# Patient Record
Sex: FEMALE | Race: WHITE | NOT HISPANIC OR LATINO | Employment: OTHER | URBAN - METROPOLITAN AREA
[De-identification: names, ages, dates, MRNs, and addresses within clinical notes are randomized per-mention and may not be internally consistent; named-entity substitution may affect disease eponyms.]

---

## 2018-12-19 ENCOUNTER — TELEPHONE (OUTPATIENT)
Dept: ENDOCRINOLOGY | Facility: HOSPITAL | Age: 78
End: 2018-12-19

## 2018-12-19 DIAGNOSIS — E89.0 POSTSURGICAL HYPOTHYROIDISM: ICD-10-CM

## 2018-12-19 DIAGNOSIS — E21.3 HYPERPARATHYROIDISM, UNSPECIFIED (HCC): Primary | ICD-10-CM

## 2019-01-11 DIAGNOSIS — E03.9 ACQUIRED HYPOTHYROIDISM: Primary | ICD-10-CM

## 2019-01-14 RX ORDER — LEVOTHYROXINE SODIUM 0.1 MG/1
TABLET ORAL
Qty: 90 TABLET | Refills: 0 | Status: SHIPPED | OUTPATIENT
Start: 2019-01-14 | End: 2019-02-12 | Stop reason: SDUPTHER

## 2019-02-12 ENCOUNTER — OFFICE VISIT (OUTPATIENT)
Dept: ENDOCRINOLOGY | Facility: HOSPITAL | Age: 79
End: 2019-02-12
Payer: MEDICARE

## 2019-02-12 VITALS
WEIGHT: 141.6 LBS | SYSTOLIC BLOOD PRESSURE: 130 MMHG | BODY MASS INDEX: 25.09 KG/M2 | HEART RATE: 98 BPM | HEIGHT: 63 IN | DIASTOLIC BLOOD PRESSURE: 78 MMHG

## 2019-02-12 DIAGNOSIS — E89.0 POSTSURGICAL HYPOTHYROIDISM: Primary | ICD-10-CM

## 2019-02-12 DIAGNOSIS — M85.832 OSTEOPENIA OF LEFT FOREARM: ICD-10-CM

## 2019-02-12 DIAGNOSIS — Z98.890 HISTORY OF PARATHYROID SURGERY: ICD-10-CM

## 2019-02-12 DIAGNOSIS — E03.9 ACQUIRED HYPOTHYROIDISM: ICD-10-CM

## 2019-02-12 DIAGNOSIS — M85.88 OSTEOPENIA OF SPINE: ICD-10-CM

## 2019-02-12 DIAGNOSIS — Z86.39 HISTORY OF HYPERTHYROIDISM: ICD-10-CM

## 2019-02-12 DIAGNOSIS — M81.6 LOCALIZED OSTEOPOROSIS WITHOUT CURRENT PATHOLOGICAL FRACTURE: ICD-10-CM

## 2019-02-12 DIAGNOSIS — E21.3 HYPERPARATHYROIDISM, UNSPECIFIED (HCC): ICD-10-CM

## 2019-02-12 PROBLEM — M85.80 OSTEOPENIA: Status: ACTIVE | Noted: 2019-02-12

## 2019-02-12 PROBLEM — M81.0 AGE-RELATED OSTEOPOROSIS WITHOUT CURRENT PATHOLOGICAL FRACTURE: Status: ACTIVE | Noted: 2019-02-12

## 2019-02-12 PROCEDURE — 99215 OFFICE O/P EST HI 40 MIN: CPT | Performed by: INTERNAL MEDICINE

## 2019-02-12 RX ORDER — ASPIRIN 81 MG/1
81 TABLET ORAL DAILY
COMMUNITY

## 2019-02-12 RX ORDER — METOPROLOL SUCCINATE 25 MG/1
25 TABLET, EXTENDED RELEASE ORAL
COMMUNITY
Start: 2018-11-19

## 2019-02-12 RX ORDER — AMLODIPINE BESYLATE 5 MG/1
5 TABLET ORAL DAILY
COMMUNITY
End: 2020-02-12 | Stop reason: ALTCHOICE

## 2019-02-12 RX ORDER — POLYETHYLENE GLYCOL 3350 17 G/17G
17 POWDER, FOR SOLUTION ORAL EVERY OTHER DAY
COMMUNITY
End: 2020-02-12 | Stop reason: ALTCHOICE

## 2019-02-12 RX ORDER — LEVOTHYROXINE SODIUM 0.1 MG/1
100 TABLET ORAL DAILY
Qty: 90 TABLET | Refills: 3 | Status: SHIPPED | OUTPATIENT
Start: 2019-02-12 | End: 2020-02-12 | Stop reason: DRUGHIGH

## 2019-02-12 RX ORDER — SIMVASTATIN 10 MG
10 TABLET ORAL
COMMUNITY
Start: 2018-12-10

## 2019-02-12 RX ORDER — LISINOPRIL 20 MG/1
20 TABLET ORAL DAILY
COMMUNITY
End: 2020-02-12 | Stop reason: ALTCHOICE

## 2019-02-12 RX ORDER — LANOLIN ALCOHOL/MO/W.PET/CERES
1 CREAM (GRAM) TOPICAL
COMMUNITY
End: 2019-08-20 | Stop reason: ALTCHOICE

## 2019-02-12 NOTE — LETTER
February 12, 2019     Janeth Higginbotham MD  [address] [facility] [email or web address], [address] [address]    Patient: Jeffery Harrington   YOB: 1940   Date of Visit: 2/12/2019       Dear Dr Chelly Floyd: Thank you for referring Saray Mendieta to me for evaluation  Below are my notes for this consultation  If you have questions, please do not hesitate to call me  I look forward to following your patient along with you  Sincerely,        Rita Solano MD        CC: DO Rita Porras MD  2/12/2019 12:06 PM  Sign at close encounter  2/12/2019    Assessment/Plan      Diagnoses and all orders for this visit:    Postsurgical hypothyroidism  -     T4, free Lab Collect; Future  -     TSH, 3rd generation Lab Collect; Future    History of hyperthyroidism  -     T4, free Lab Collect; Future  -     TSH, 3rd generation Lab Collect; Future    Hyperparathyroidism, unspecified ([facility] [address] )  -     Comprehensive metabolic panel Lab Collect; Future  -     Phosphorus Lab Collect; Future  -     PTH, intact Lab Collect Lab Collect; Future    History of parathyroid surgery  -     Comprehensive metabolic panel Lab Collect; Future  -     Phosphorus Lab Collect; Future  -     PTH, intact Lab Collect Lab Collect; Future    Osteopenia of spine  -     Comprehensive metabolic panel Lab Collect; Future  -     Phosphorus Lab Collect; Future  -     PTH, intact Lab Collect Lab Collect; Future  -     T4, free Lab Collect; Future  -     TSH, 3rd generation Lab Collect; Future    Localized osteoporosis without current pathological fracture  -     Comprehensive metabolic panel Lab Collect; Future  -     Phosphorus Lab Collect; Future  -     PTH, intact Lab Collect Lab Collect; Future  -     T4, free Lab Collect; Future  -     TSH, 3rd generation Lab Collect; Future    Acquired hypothyroidism  -     levothyroxine 100 mcg tablet;  Take 1 tablet (100 mcg total) by mouth daily    Osteopenia of left forearm    Other orders  -     metoprolol succinate (TOPROL-XL) 25 mg 24 hr tablet; Take 25 mg by mouth daily   -     lisinopril (ZESTRIL) 20 mg tablet; Take 20 mg by mouth daily  -     amLODIPine (NORVASC) 5 mg tablet; Take 5 mg by mouth daily  -     simvastatin (ZOCOR) 10 mg tablet; Take 10 mg by mouth daily at bedtime   -     Calcium Carbonate-Vitamin D (OSCAL 500/200 D-3 PO); Take by mouth Take 2 tablets daily  -     aspirin (ECOTRIN LOW STRENGTH) 81 mg EC tablet; Take 81 mg by mouth daily  -     magnesium chloride-calcium (SLOW-MAG) 71 5-119 mg; Take 2 tablets by mouth daily  -     Multiple Vitamin (MULTI-VITAMIN DAILY PO); Take by mouth daily   -     glucosamine-chondroitin 500-400 MG tablet; Take 1 tablet by mouth 2 tablets daily  -     polyethylene glycol (MIRALAX) 17 g packet; Take 17 g by mouth every other day        Assessment/Plan:  1  Hypothyroidism post thyroidectomy  She is biochemically euthyroid based on her most recent blood work which was normal   She will continue the same levothyroxine 100 mcg daily  2  History of hyperthyroidism  She has no evidence of Graves eye disease and sees an eye doctor on a regular basis on a yearly basis  2  History of hyperparathyroidism post 2 parathyroid adenoma removals  Most recent calcium, phosphorus, and parathyroid hormone levels have been normal   These will be followed over time as she is at risk for developing hyperparathyroidism again  4  History of osteopenia of the spine and forearm and osteoporosis of the left hip  She had a DEXA scan done within the last year or two  I do not have those results so I will try to obtain them to see if there are any changes  I have asked her to follow up in 1 year with preceding TSH, free T4, CMP, phosphorus, and intact parathyroid hormone level        CC:  Hypothyroid and hyperparathyroid follow-up    History of Present Illness     HPI: Mickey Kam is a [de-identified]y o  year old female with history of hypothyroidism post thyroidectomy and hyperparathyroidism post 2 parathyroid adenoma removals  She has hypothyroidism post partial thyroidectomy at the age of 22 for hyperthyroidism  She is currently taking levothyroxine 100 mcg daily  She is always on the cold side  She denies heat intolerance, palpitation, tremors, anxiety or depression, insomnia, or weight changes  She has no diarrhea but does tend to be on the constipated side  She has some fatigue  She has some dry skin of her legs but no brittle nails or hair loss  She has no diplopia  She has no compressive thyroid symptoms or difficulties with swallowing  Of note, she did have radiation treatment to her left breast for breast cancer and it is unclear whether the neck was involve in the radiation field  She was found to have hyperparathyroidism in 2012 and a right inferior parathyroid adenoma was removed with complications of right pneumothorax and traction injury to the recurrent laryngeal nerve on the right postoperatively  She was seen by ENT and this resolved  Unfortunately, she continued to have hyperparathyroidism and was placed on Sensipar postoperatively  She saw Dr Simone Nichole at [facility] and underwent right superior parathyroid adenoma removal in January of 2013 which did resolve her hyperparathyroidism  She has no history of renal stones but has lost 3 inches of height and has a history of osteoporosis of the left hip with osteopenia of the forearm and spine  Reportedly, last DEXA scan was within the last several years but I do not have that report  She denies poly urea or polydipsia but has nocturia once a night  She denies any confusion  She has some fatigue  She does tend to be on the constipated side and uses MiraLax every other day  She denies any perioral or finger tip paresthesias or muscle twitches or spasms  She denies muscle weakness  Review of Systems   Constitutional: Positive for fatigue   Negative for unexpected weight change  HENT: Negative for hearing loss, tinnitus and trouble swallowing  Had left sided XRT after breast cancer  Eyes: Negative for visual disturbance  No diplopia  Wears glasses  Sees eye doctor yearly  Respiratory: Negative for chest tightness and shortness of breath  Cardiovascular: Positive for leg swelling  Negative for chest pain and palpitations  Ankles swell by the end of the day  Gastrointestinal: Positive for constipation  Negative for abdominal pain, diarrhea and nausea  Tends to be on the constipated side, takes miralax every other day  Endocrine: Positive for cold intolerance  Negative for heat intolerance, polydipsia and polyuria  Always cold  Nocturia once a night  Genitourinary:        No kidney stones  Musculoskeletal: Positive for arthralgias and back pain  Has right arm lymphedema  Back to lymphedema therapy in 2018 and now wearing the arm sleeve for lymphedema  Has bilateral knee pain  Surgery postponed due to breast cancer surgery  Gets injections in knees at times  Some low back pain at times  Last Dexa in 2018 and history of height loss of 3 inches in the past    Skin: Negative for rash  Has some dry skin especially of the legs  No brittle nails  No hair loss  Neurological: Negative for dizziness, tremors, weakness, light-headedness, numbness and headaches  No perioral numbness or tingling  No muscle twitches or spasms  Psychiatric/Behavioral: Negative for confusion, dysphoric mood and sleep disturbance  The patient is not nervous/anxious  Can't sleep on her back  Sometimes can't get back to sleep after urinating at night  Gets 6 good hours of sleep a night         Historical Information   Past Medical History:   Diagnosis Date    Breast cancer [facility]) 1972    right; mastectomy    Breast cancer ([facility] [address] ) 1992    left, post lumpectomy and AND with XRT and tamoxifen    Breast cancer ([facility] [address] ) 05/2018    left and now mastectomy    Chronic acquired lymphedema     right upper extremity    Coronary artery disease     Depression     History of endocarditis     History of pneumothorax 2012    right post parathyroid surgery    Hyperlipidemia     Hypertension     Hypomagnesemia     Myocardial infarction (HCC)     Osteoarthritis     Ovarian cancer (HCC)     stage 3C, right;  9 5 cm adenocarcinoma with 5 5 cm serosal met involving rectosigmoid colon   Received Taxol/carbiplatin    Recurrent laryngeal nerve palsy 2012    due to traction injury for right parathyroidectomy    Squamous cell skin cancer 03/2018    scalp    Thymoma 2009    resected     Past Surgical History:   Procedure Laterality Date    BREAST LUMPECTOMY Left 1992    with AND    CHOLECYSTECTOMY OPEN      post cholecystostomy tube with hemorrhage    HYSTERECTOMY  1970    with appendectomy    MAMMO STEREOTACTIC BREAST BIOPSY LEFT (ALL INC) Left 2018    MASS EXCISION  2009    thymoma resection    MASTECTOMY Right     MASTECTOMY Left 05/2018    OVARY SURGERY      cancer removal with omenectomy and bowel resection with creation of gil's pouch    PARATHYROIDECTOMY Right 2012    PARATHYROIDECTOMY Right 01/2013    right superior parathyroid adenoma    SKIN CANCER EXCISION  03/2018    wide excision    THYROIDECTOMY      age 22 for hyperthyroidism, partial     Social History   Social History     Substance and Sexual Activity   Alcohol Use Never    Frequency: Never     Social History     Substance and Sexual Activity   Drug Use Never     Social History     Tobacco Use   Smoking Status Never Smoker   Smokeless Tobacco Never Used     Family History:   Family History   Problem Relation Age of Onset    Hypertension Mother     Stomach cancer Father     Diabetes type II Brother     Diabetes type II Brother     Breast cancer Sister     Cancer Brother     Cancer Brother     Heart disease Brother     Heart attack Sister        Meds/Allergies   Current Outpatient Medications   Medication Sig Dispense Refill    amLODIPine (NORVASC) 5 mg tablet Take 5 mg by mouth daily      aspirin (ECOTRIN LOW STRENGTH) 81 mg EC tablet Take 81 mg by mouth daily      Calcium Carbonate-Vitamin D (OSCAL 500/200 D-3 PO) Take by mouth Take 2 tablets daily      glucosamine-chondroitin 500-400 MG tablet Take 1 tablet by mouth 2 tablets daily      levothyroxine 100 mcg tablet Take 1 tablet (100 mcg total) by mouth daily 90 tablet 3    lisinopril (ZESTRIL) 20 mg tablet Take 20 mg by mouth daily      magnesium chloride-calcium (SLOW-MAG) 71 5-119 mg Take 2 tablets by mouth daily      metoprolol succinate (TOPROL-XL) 25 mg 24 hr tablet Take 25 mg by mouth daily       Multiple Vitamin (MULTI-VITAMIN DAILY PO) Take by mouth daily       polyethylene glycol (MIRALAX) 17 g packet Take 17 g by mouth every other day      simvastatin (ZOCOR) 10 mg tablet Take 10 mg by mouth daily at bedtime        No current facility-administered medications for this visit  Allergies   Allergen Reactions    Bactrim [Sulfamethoxazole-Trimethoprim] GI Intolerance    Penicillins Hives       Objective   Vitals: Blood pressure 130/78, pulse 98, height 5' 3" (1 6 m), weight 64 2 kg (141 lb 9 6 oz)  Invasive Devices          None          Physical Exam   Constitutional: She is oriented to person, place, and time  She appears well-developed and well-nourished  HENT:   Head: Normocephalic and atraumatic  Negative [place]ek sign  Eyes: Pupils are equal, round, and reactive to light  Conjunctivae and EOM are normal    No lid lag, stare, proptosis, or periorbital edema  Neck: Normal range of motion  Neck supple  No thyromegaly present  Healed anterior neck scar  No palpable thyroid tissue  No masses of the neck  No carotid bruits  Cardiovascular: Normal rate, regular rhythm and normal heart sounds  No murmur heard  Pulmonary/Chest: Effort normal and breath sounds normal  She has no wheezes  Post bilateral mastectomies  Abdominal: Soft  Bowel sounds are normal  There is no tenderness  Musculoskeletal: Normal range of motion  She exhibits edema  She exhibits no deformity  No tremor of the outstretched hands  No spinous process tenderness  No CVA tenderness  Trace bilateral ankle edema  Right upper extremity with lymphedema  Lymphadenopathy:     She has no cervical adenopathy  Neurological: She is alert and oriented to person, place, and time  She has normal reflexes  Deep tendon reflexes normal without briskness  Skin: Skin is warm and dry  No rash noted  Vitals reviewed  The history was obtained from the review of the chart, [facility] endocrinology notes, and from the patient  Lab Results:    Blood work done a [employer] [facility] on 01/28/2019 demonstrates a TSH of 2 96 with a free T4 1 57  CMP demonstrates a calcium of 9 6 with albumin of 4 2 and phosphorus of 3 6  Intact parathyroid hormone level was 34  The rest of the CMP was normal       No results found for this or any previous visit (from the past 02963 hour(s))        Future Appointments   Date Time Provider Solitario Sanchez   2/12/2020 11:20 AM Daun Spatz, [address] [facility]

## 2019-02-12 NOTE — PATIENT INSTRUCTIONS
Thyroid blood work normal    Continue the same levothyroxine 100 mcg daily  the parathyroid and calcium blood work are normal   We'll work on getting the dexa scan report  Follow up in 1 year with blood work

## 2019-02-12 NOTE — PROGRESS NOTES
2/12/2019    Assessment/Plan      Diagnoses and all orders for this visit:    Postsurgical hypothyroidism  -     T4, free Lab Collect; Future  -     TSH, 3rd generation Lab Collect; Future    History of hyperthyroidism  -     T4, free Lab Collect; Future  -     TSH, 3rd generation Lab Collect; Future    Hyperparathyroidism, unspecified ([facility] [address] )  -     Comprehensive metabolic panel Lab Collect; Future  -     Phosphorus Lab Collect; Future  -     PTH, intact Lab Collect Lab Collect; Future    History of parathyroid surgery  -     Comprehensive metabolic panel Lab Collect; Future  -     Phosphorus Lab Collect; Future  -     PTH, intact Lab Collect Lab Collect; Future    Osteopenia of spine  -     Comprehensive metabolic panel Lab Collect; Future  -     Phosphorus Lab Collect; Future  -     PTH, intact Lab Collect Lab Collect; Future  -     T4, free Lab Collect; Future  -     TSH, 3rd generation Lab Collect; Future    Localized osteoporosis without current pathological fracture  -     Comprehensive metabolic panel Lab Collect; Future  -     Phosphorus Lab Collect; Future  -     PTH, intact Lab Collect Lab Collect; Future  -     T4, free Lab Collect; Future  -     TSH, 3rd generation Lab Collect; Future    Acquired hypothyroidism  -     levothyroxine 100 mcg tablet; Take 1 tablet (100 mcg total) by mouth daily    Osteopenia of left forearm    Other orders  -     metoprolol succinate (TOPROL-XL) 25 mg 24 hr tablet; Take 25 mg by mouth daily   -     lisinopril (ZESTRIL) 20 mg tablet; Take 20 mg by mouth daily  -     amLODIPine (NORVASC) 5 mg tablet; Take 5 mg by mouth daily  -     simvastatin (ZOCOR) 10 mg tablet; Take 10 mg by mouth daily at bedtime   -     Calcium Carbonate-Vitamin D (OSCAL 500/200 D-3 PO); Take by mouth Take 2 tablets daily  -     aspirin (ECOTRIN LOW STRENGTH) 81 mg EC tablet;  Take 81 mg by mouth daily  -     magnesium chloride-calcium (SLOW-MAG) 71 5-119 mg; Take 2 tablets by mouth daily  -     Multiple Vitamin (MULTI-VITAMIN DAILY PO); Take by mouth daily   -     glucosamine-chondroitin 500-400 MG tablet; Take 1 tablet by mouth 2 tablets daily  -     polyethylene glycol (MIRALAX) 17 g packet; Take 17 g by mouth every other day        Assessment/Plan:  1  Hypothyroidism post thyroidectomy  She is biochemically euthyroid based on her most recent blood work which was normal   She will continue the same levothyroxine 100 mcg daily  2  History of hyperthyroidism  She has no evidence of Graves eye disease and sees an eye doctor on a regular basis on a yearly basis  2  History of hyperparathyroidism post 2 parathyroid adenoma removals  Most recent calcium, phosphorus, and parathyroid hormone levels have been normal   These will be followed over time as she is at risk for developing hyperparathyroidism again  4  History of osteopenia of the spine and forearm and osteoporosis of the left hip  She had a DEXA scan done within the last year or two  I do not have those results so I will try to obtain them to see if there are any changes  I have asked her to follow up in 1 year with preceding TSH, free T4, CMP, phosphorus, and intact parathyroid hormone level  CC:  Hypothyroid and hyperparathyroid follow-up    History of Present Illness     HPI: Rukhsana Ugalde is a [de-identified]y o  year old female with history of hypothyroidism post thyroidectomy and hyperparathyroidism post 2 parathyroid adenoma removals  She has hypothyroidism post partial thyroidectomy at the age of 22 for hyperthyroidism  She is currently taking levothyroxine 100 mcg daily  She is always on the cold side  She denies heat intolerance, palpitation, tremors, anxiety or depression, insomnia, or weight changes  She has no diarrhea but does tend to be on the constipated side  She has some fatigue  She has some dry skin of her legs but no brittle nails or hair loss  She has no diplopia    She has no compressive thyroid symptoms or difficulties with swallowing  Of note, she did have radiation treatment to her left breast for breast cancer and it is unclear whether the neck was involve in the radiation field  She was found to have hyperparathyroidism in 2012 and a right inferior parathyroid adenoma was removed with complications of right pneumothorax and traction injury to the recurrent laryngeal nerve on the right postoperatively  She was seen by ENT and this resolved  Unfortunately, she continued to have hyperparathyroidism and was placed on Sensipar postoperatively  She saw Dr Tavo Burgos at [facility] and underwent right superior parathyroid adenoma removal in January of 2013 which did resolve her hyperparathyroidism  She has no history of renal stones but has lost 3 inches of height and has a history of osteoporosis of the left hip with osteopenia of the forearm and spine  Reportedly, last DEXA scan was within the last several years but I do not have that report  She denies poly urea or polydipsia but has nocturia once a night  She denies any confusion  She has some fatigue  She does tend to be on the constipated side and uses MiraLax every other day  She denies any perioral or finger tip paresthesias or muscle twitches or spasms  She denies muscle weakness  Review of Systems   Constitutional: Positive for fatigue  Negative for unexpected weight change  HENT: Negative for hearing loss, tinnitus and trouble swallowing  Had left sided XRT after breast cancer  Eyes: Negative for visual disturbance  No diplopia  Wears glasses  Sees eye doctor yearly  Respiratory: Negative for chest tightness and shortness of breath  Cardiovascular: Positive for leg swelling  Negative for chest pain and palpitations  Ankles swell by the end of the day  Gastrointestinal: Positive for constipation  Negative for abdominal pain, diarrhea and nausea  Tends to be on the constipated side, takes miralax every other day  Endocrine: Positive for cold intolerance  Negative for heat intolerance, polydipsia and polyuria  Always cold  Nocturia once a night  Genitourinary:        No kidney stones  Musculoskeletal: Positive for arthralgias and back pain  Has right arm lymphedema  Back to lymphedema therapy in 2018 and now wearing the arm sleeve for lymphedema  Has bilateral knee pain  Surgery postponed due to breast cancer surgery  Gets injections in knees at times  Some low back pain at times  Last Dexa in 2018 and history of height loss of 3 inches in the past    Skin: Negative for rash  Has some dry skin especially of the legs  No brittle nails  No hair loss  Neurological: Negative for dizziness, tremors, weakness, light-headedness, numbness and headaches  No perioral numbness or tingling  No muscle twitches or spasms  Psychiatric/Behavioral: Negative for confusion, dysphoric mood and sleep disturbance  The patient is not nervous/anxious  Can't sleep on her back  Sometimes can't get back to sleep after urinating at night  Gets 6 good hours of sleep a night  Historical Information   Past Medical History:   Diagnosis Date    Breast cancer [facility]) 1972    right; mastectomy    Breast cancer ([facility] [address] ) 1992    left, post lumpectomy and AND with XRT and tamoxifen    Breast cancer ([facility] [address] ) 05/2018    left and now mastectomy    Chronic acquired lymphedema     right upper extremity    Coronary artery disease     Depression     History of endocarditis     History of pneumothorax 2012    right post parathyroid surgery    Hyperlipidemia     Hypertension     Hypomagnesemia     Myocardial infarction ([facility] [address] )     Osteoarthritis     Ovarian cancer (HCC)     stage 3C, right;  9 5 cm adenocarcinoma with 5 5 cm serosal met involving rectosigmoid colon   Received Taxol/carbiplatin    Recurrent laryngeal nerve palsy 2012    due to traction injury for right parathyroidectomy    Squamous cell skin cancer 03/2018    scalp    Thymoma 2009    resected     Past Surgical History:   Procedure Laterality Date    BREAST LUMPECTOMY Left 1992    with AND    CHOLECYSTECTOMY OPEN      post cholecystostomy tube with hemorrhage    HYSTERECTOMY  1970    with appendectomy    MAMMO STEREOTACTIC BREAST BIOPSY LEFT ([facility] INC) Left 2018    MASS EXCISION  2009    thymoma resection    MASTECTOMY Right     MASTECTOMY Left 05/2018    OVARY SURGERY      cancer removal with omenectomy and bowel resection with creation of gil's pouch    PARATHYROIDECTOMY Right 2012    PARATHYROIDECTOMY Right 01/2013    right superior parathyroid adenoma    SKIN CANCER EXCISION  03/2018    wide excision    THYROIDECTOMY      age 22 for hyperthyroidism, partial     Social History   Social History     Substance and Sexual Activity   Alcohol Use Never    Frequency: Never     Social History     Substance and Sexual Activity   Drug Use Never     Social History     Tobacco Use   Smoking Status Never Smoker   Smokeless Tobacco Never Used     Family History:   Family History   Problem Relation Age of Onset    Hypertension Mother     Stomach cancer Father     Diabetes type II Brother     Diabetes type II Brother     Breast cancer Sister     Cancer Brother     Cancer Brother     Heart disease Brother     Heart attack Sister        Meds/Allergies   Current Outpatient Medications   Medication Sig Dispense Refill    amLODIPine (NORVASC) 5 mg tablet Take 5 mg by mouth daily      aspirin (ECOTRIN LOW STRENGTH) 81 mg EC tablet Take 81 mg by mouth daily      Calcium Carbonate-Vitamin D (OSCAL 500/200 D-3 PO) Take by mouth Take 2 tablets daily      glucosamine-chondroitin 500-400 MG tablet Take 1 tablet by mouth 2 tablets daily      levothyroxine 100 mcg tablet Take 1 tablet (100 mcg total) by mouth daily 90 tablet 3    lisinopril (ZESTRIL) 20 mg tablet Take 20 mg by mouth daily      magnesium chloride-calcium (SLOW-MAG) 71 5-119 mg Take 2 tablets by mouth daily      metoprolol succinate (TOPROL-XL) 25 mg 24 hr tablet Take 25 mg by mouth daily       Multiple Vitamin (MULTI-VITAMIN DAILY PO) Take by mouth daily       polyethylene glycol (MIRALAX) 17 g packet Take 17 g by mouth every other day      simvastatin (ZOCOR) 10 mg tablet Take 10 mg by mouth daily at bedtime        No current facility-administered medications for this visit  Allergies   Allergen Reactions    Bactrim [Sulfamethoxazole-Trimethoprim] GI Intolerance    Penicillins Hives       Objective   Vitals: Blood pressure 130/78, pulse 98, height 5' 3" (1 6 m), weight 64 2 kg (141 lb 9 6 oz)  Invasive Devices          None          Physical Exam   Constitutional: She is oriented to person, place, and time  She appears well-developed and well-nourished  HENT:   Head: Normocephalic and atraumatic  Negative [place] sign  Eyes: Pupils are equal, round, and reactive to light  Conjunctivae and EOM are normal    No lid lag, stare, proptosis, or periorbital edema  Neck: Normal range of motion  Neck supple  No thyromegaly present  Healed anterior neck scar  No palpable thyroid tissue  No masses of the neck  No carotid bruits  Cardiovascular: Normal rate, regular rhythm and normal heart sounds  No murmur heard  Pulmonary/Chest: Effort normal and breath sounds normal  She has no wheezes  Post bilateral mastectomies  Abdominal: Soft  Bowel sounds are normal  There is no tenderness  Musculoskeletal: Normal range of motion  She exhibits edema  She exhibits no deformity  No tremor of the outstretched hands  No spinous process tenderness  No CVA tenderness  Trace bilateral ankle edema  Right upper extremity with lymphedema  Lymphadenopathy:     She has no cervical adenopathy  Neurological: She is alert and oriented to person, place, and time  She has normal reflexes  Deep tendon reflexes normal without briskness  Skin: Skin is warm and dry  No rash noted  Vitals reviewed  The history was obtained from the review of the chart, [facility] endocrinology notes, and from the patient  Lab Results:    Blood work done a [facility] on 01/28/2019 demonstrates a TSH of 2 96 with a free T4 1 57  CMP demonstrates a calcium of 9 6 with albumin of 4 2 and phosphorus of 3 6  Intact parathyroid hormone level was 34  The rest of the CMP was normal       No results found for this or any previous visit (from the past 58675 hour(s))        Future Appointments   Date Time Provider Solitario Sanchez   2/12/2020 11:20 AM Gabrielle Rowe, [facility]

## 2019-08-19 ENCOUNTER — TELEPHONE (OUTPATIENT)
Dept: ENDOCRINOLOGY | Facility: HOSPITAL | Age: 79
End: 2019-08-19

## 2019-08-19 NOTE — TELEPHONE ENCOUNTER
Pt saw her OBGYN last week and they felt a possible nodule on the right side  She has had issues for the last few weeks swallowing large pills  She said she isn't having any issues swallowing anything else  Do you want to see her before ordering any additional tests? She doesn't have a f/u till February

## 2019-08-19 NOTE — TELEPHONE ENCOUNTER
I would have her evaluated in the office  She had her thyroid out so there should be no thyroid tissue left

## 2019-08-20 ENCOUNTER — OFFICE VISIT (OUTPATIENT)
Dept: ENDOCRINOLOGY | Facility: HOSPITAL | Age: 79
End: 2019-08-20
Payer: MEDICARE

## 2019-08-20 VITALS
DIASTOLIC BLOOD PRESSURE: 76 MMHG | SYSTOLIC BLOOD PRESSURE: 124 MMHG | WEIGHT: 134.6 LBS | HEART RATE: 82 BPM | HEIGHT: 63 IN | BODY MASS INDEX: 23.85 KG/M2

## 2019-08-20 DIAGNOSIS — E21.3 HYPERPARATHYROIDISM, UNSPECIFIED (HCC): ICD-10-CM

## 2019-08-20 DIAGNOSIS — E89.0 POSTSURGICAL HYPOTHYROIDISM: Primary | ICD-10-CM

## 2019-08-20 DIAGNOSIS — Z98.890 HISTORY OF PARATHYROID SURGERY: ICD-10-CM

## 2019-08-20 DIAGNOSIS — R22.1 MASS OF RIGHT SIDE OF NECK: ICD-10-CM

## 2019-08-20 DIAGNOSIS — Z86.39 HISTORY OF HYPERTHYROIDISM: ICD-10-CM

## 2019-08-20 PROCEDURE — 99214 OFFICE O/P EST MOD 30 MIN: CPT | Performed by: INTERNAL MEDICINE

## 2019-08-20 RX ORDER — COLCHICINE 0.6 MG/1
0.6 TABLET ORAL DAILY
COMMUNITY

## 2019-08-20 NOTE — PATIENT INSTRUCTIONS
Let's get thyroid and neck ultrasound to see what/where this is  I recommend seeing Dr Vince Madrid to evaluate the swallowing issues  Follow up as scheduled for now

## 2019-08-20 NOTE — LETTER
August 20, 2019     Aristides Foss MD  [address] [address]    Patient: Selene Sorensen   YOB: 1940   Date of Visit: 8/20/2019       Dear Dr Keith Sofia: Thank you for referring Jesus Crockett to me for evaluation  Below are my notes for this consultation  If you have questions, please do not hesitate to call me  I look forward to following your patient along with you  Sincerely,        Jason Murphy MD        CC: DO Ana Hdz MD Aletta Estimable, MD Darl Scarlet, MD  8/20/2019  1:44 PM  Sign at close encounter  8/20/2019    Assessment/Plan      Diagnoses and all orders for this visit:    Postsurgical hypothyroidism  -     US head neck soft tissue; Future    Hyperparathyroidism, unspecified ([address] )  -     US head neck soft tissue; Future    Mass of right side of neck  -     US head neck soft tissue; Future    History of parathyroid surgery  -     US head neck soft tissue; Future    History of hyperthyroidism  -     US head neck soft tissue; Future    Other orders  -     colchicine (COLCRYS) 0 6 mg tablet; Take 0 6 mg by mouth daily        Assessment/Plan:    1  Hypothyroidism post thyroidectomy  For now, she will continue the same levothyroxine 100 mcg daily  2  Hyperparathyroidism post 2 parathyroid adenoma removals  3  Right-sided neck mass  She does appear to have a small nodule in the thyroid bed  I do not remember her having had this before  I will do a neck and soft tissue/thyroid ultrasound to better evaluate the tissue  She was warned she may need a CT scan of the neck in the future  4  Swallowing issues  She is starting to have some issues with swallowing large pills over the last 2 weeks  I have referred her back to her gastroenterologist, Dr Vidal John for further evaluation  She may need a swallowing study or EGD      At the moment, she has a follow-up visit scheduled in 2020 with blood work which she will keep at this point  CC:   Hypothyroid and hyperparathyroid follow-up    History of Present Illness     HPI: Chan Sol is a [de-identified]y o  year old female with history of  Hypothyroidism post thyroidectomy and hyperparathyroidism post 2 separate parathyroid adenoma removals  Was to GYN last week and he felt lump right side of neck  Reportedly had necrotic lesion 2016 biopsied right paratracheal area  Reportedly benign  Then CT neck several months later and lesion was gone  She started to have swallowing issues several weeks ago  Mostly to large pills  Has problems with swallowing large amounts of food, she feels it different going down  Her thyroidectomy occurred at the age of 22 for hyperthyroidism  It is unclear exactly how much of her thyroid was removed but I suspect most of it  She is always cold  She has alternating diarrhea and constipation  She denies palpitation, tremors, heat intolerance  She is fatigued and has lost 7 lb since last visit  She does admit to snacking less and eating less candy although appetite is a bit less also  She has some difficulty getting back to sleep after she wakes urinate  She has no diplopia  She was found to have hyperparathyroidism in 2012 and right inferior parathyroid adenoma was removed with complications of right pneumothorax and traction injury to the recurrent laryngeal nerve on the right  She was seen by ENT in this resolved  She then had continued hyperparathyroidism and was placed on Sensipar  She underwent right superior parathyroid adenoma removal in January of 2013 which did resolve her hyperparathyroidism  She denies polyuria or polydipsia but has once a night nocturia  She does have some constipation and fatigue  She denies confusion  Review of Systems   Constitutional: Positive for fatigue and unexpected weight change  Weight is 7 lb less than last visit  admits to snacking less and less candy and appetite a bit lower  Falls asleep when idle  HENT: Positive for trouble swallowing  Eyes: Negative for visual disturbance  No diplopia  Wears glasses  Respiratory: Negative for chest tightness and shortness of breath  Cardiovascular: Negative for chest pain and palpitations  Gastrointestinal: Positive for constipation and diarrhea  Negative for abdominal pain and nausea  Can have loose bowel movements alternating with constipation  Endocrine: Positive for cold intolerance  Negative for heat intolerance, polydipsia and polyuria  Nocturia once a night  Neurological: Negative for tremors and numbness  Psychiatric/Behavioral: Positive for sleep disturbance  Negative for confusion  Has some difficulty getting back to sleep after wakes to urinate, mind races  Historical Information   Past Medical History:   Diagnosis Date    Breast cancer [facility]) 1972    right; mastectomy    Breast cancer ([facility] ) 1992    left, post lumpectomy and AND with XRT and tamoxifen    Breast cancer ([facility] ) 05/2018    left and now mastectomy    Chronic acquired lymphedema     right upper extremity    Coronary artery disease     Depression     History of endocarditis     History of pneumothorax 2012    right post parathyroid surgery    Hyperlipidemia     Hypertension     Hypomagnesemia     Myocardial infarction ([facility] )     Osteoarthritis     Ovarian cancer (HCC)     stage 3C, right;  9 5 cm adenocarcinoma with 5 5 cm serosal met involving rectosigmoid colon   Received Taxol/carbiplatin    Personal history of calcium pyrophosphate deposition disease (CPPD)     Recurrent laryngeal nerve palsy 2012    due to traction injury for right parathyroidectomy    Squamous cell skin cancer 03/2018    scalp    Thymoma 2009    resected     Past Surgical History:   Procedure Laterality Date    BREAST LUMPECTOMY Left 1992    with AND    CHOLECYSTECTOMY OPEN      post cholecystostomy tube with hemorrhage    HYSTERECTOMY  1970    with appendectomy    MAMMO STEREOTACTIC BREAST BIOPSY LEFT ([facility] INC) Left 2018    MASS EXCISION  2009    thymoma resection    MASTECTOMY Right     MASTECTOMY Left 05/2018    OVARY SURGERY      cancer removal with omenectomy and bowel resection with creation of gil's pouch    PARATHYROIDECTOMY Right 2012    PARATHYROIDECTOMY Right 01/2013    right superior parathyroid adenoma    SKIN CANCER EXCISION  03/2018    wide excision    THYROIDECTOMY      age 22 for hyperthyroidism, partial     Social History   Social History     Substance and Sexual Activity   Alcohol Use Never    Frequency: Never     Social History     Substance and Sexual Activity   Drug Use Never     Social History     Tobacco Use   Smoking Status Never Smoker   Smokeless Tobacco Never Used     Family History:   Family History   Problem Relation Age of Onset    Hypertension Mother     Stomach cancer Father     Diabetes type II Brother     Diabetes type II Brother     Breast cancer Sister     Cancer Brother     Cancer Brother     Heart disease Brother     Heart attack Sister        Meds/Allergies   Current Outpatient Medications   Medication Sig Dispense Refill    amLODIPine (NORVASC) 5 mg tablet Take 5 mg by mouth daily      aspirin (ECOTRIN LOW STRENGTH) 81 mg EC tablet Take 81 mg by mouth daily      Calcium Carbonate-Vitamin D (OSCAL 500/200 D-3 PO) Take by mouth Take 2 tablets daily      colchicine (COLCRYS) 0 6 mg tablet Take 0 6 mg by mouth daily      levothyroxine 100 mcg tablet Take 1 tablet (100 mcg total) by mouth daily 90 tablet 3    lisinopril (ZESTRIL) 20 mg tablet Take 20 mg by mouth daily      magnesium chloride-calcium (SLOW-MAG) 71 5-119 mg Take 2 tablets by mouth daily      metoprolol succinate (TOPROL-XL) 25 mg 24 hr tablet Take 25 mg by mouth daily       Multiple Vitamin (MULTI-VITAMIN DAILY PO) Take by mouth daily       polyethylene glycol (MIRALAX) 17 g packet Take 17 g by mouth every other day      simvastatin (ZOCOR) 10 mg tablet Take 10 mg by mouth daily at bedtime        No current facility-administered medications for this visit  Allergies   Allergen Reactions    Bactrim [Sulfamethoxazole-Trimethoprim] GI Intolerance    Penicillins Hives       Objective   Vitals: Blood pressure 124/76, pulse 82, height 5' 3" (1 6 m), weight 61 1 kg (134 lb 9 6 oz)  Invasive Devices     None                 Physical Exam   Constitutional: She is oriented to person, place, and time  She appears well-developed and well-nourished  HENT:   Head: Normocephalic and atraumatic  Eyes: Pupils are equal, round, and reactive to light  Conjunctivae and EOM are normal      No lid lag, stare, proptosis, or periorbital edema  Neck: Normal range of motion  Neck supple    1-1 5 cm palpable neck nodule which may or may not be thyroid lateral to the trachea and superior to her surgical site  No lymphadenopathy  No bruits over the carotids  Cardiovascular: Normal rate, regular rhythm and normal heart sounds  No murmur heard  Pulmonary/Chest: Effort normal and breath sounds normal  She has no wheezes  Musculoskeletal: Normal range of motion  She exhibits no edema or deformity  No tremor of the outstretched hands  Lymphadenopathy:     She has no cervical adenopathy  Neurological: She is alert and oriented to person, place, and time  She has normal reflexes  Skin: Skin is warm and dry  No rash noted  Vitals reviewed  The history was obtained from the review of the chart and from the patient and   Lab Results:     I have no recent blood work  No results found for this or any previous visit (from the past 70762 hour(s))        Future Appointments   Date Time Provider Solitario Sanchez   2/12/2020 11:20 AM Flaco Hightower, [facility]

## 2019-08-20 NOTE — PROGRESS NOTES
8/20/2019    Assessment/Plan      Diagnoses and all orders for this visit:    Postsurgical hypothyroidism  -     US head neck soft tissue; Future    Hyperparathyroidism, unspecified ([address] )  -     US head neck soft tissue; Future    Mass of right side of neck  -     US head neck soft tissue; Future    History of parathyroid surgery  -     US head neck soft tissue; Future    History of hyperthyroidism  -     US head neck soft tissue; Future    Other orders  -     colchicine (COLCRYS) 0 6 mg tablet; Take 0 6 mg by mouth daily        Assessment/Plan:    1  Hypothyroidism post thyroidectomy  For now, she will continue the same levothyroxine 100 mcg daily  2  Hyperparathyroidism post 2 parathyroid adenoma removals  3  Right-sided neck mass  She does appear to have a small nodule in the thyroid bed  I do not remember her having had this before  I will do a neck and soft tissue/thyroid ultrasound to better evaluate the tissue  She was warned she may need a CT scan of the neck in the future  4  Swallowing issues  She is starting to have some issues with swallowing large pills over the last 2 weeks  I have referred her back to her gastroenterologist, Dr Abi Pal for further evaluation  She may need a swallowing study or EGD  At the moment, she has a follow-up visit scheduled in 2020 with blood work which she will keep at this point  CC:   Hypothyroid and hyperparathyroid follow-up    History of Present Illness     HPI: Bee Hill is a [de-identified]y o  year old female with history of  Hypothyroidism post thyroidectomy and hyperparathyroidism post 2 separate parathyroid adenoma removals  Was to GYN last week and he felt lump right side of neck  Reportedly had necrotic lesion 2016 biopsied right paratracheal area  Reportedly benign  Then CT neck several months later and lesion was gone  She started to have swallowing issues several weeks ago  Mostly to large pills   Has problems with swallowing large amounts of food, she feels it different going down  Her thyroidectomy occurred at the age of 22 for hyperthyroidism  It is unclear exactly how much of her thyroid was removed but I suspect most of it  She is always cold  She has alternating diarrhea and constipation  She denies palpitation, tremors, heat intolerance  She is fatigued and has lost 7 lb since last visit  She does admit to snacking less and eating less candy although appetite is a bit less also  She has some difficulty getting back to sleep after she wakes urinate  She has no diplopia  She was found to have hyperparathyroidism in 2012 and right inferior parathyroid adenoma was removed with complications of right pneumothorax and traction injury to the recurrent laryngeal nerve on the right  She was seen by ENT in this resolved  She then had continued hyperparathyroidism and was placed on Sensipar  She underwent right superior parathyroid adenoma removal in January of 2013 which did resolve her hyperparathyroidism  She denies polyuria or polydipsia but has once a night nocturia  She does have some constipation and fatigue  She denies confusion  Review of Systems   Constitutional: Positive for fatigue and unexpected weight change  Weight is 7 lb less than last visit  admits to snacking less and less candy and appetite a bit lower  Falls asleep when idle  HENT: Positive for trouble swallowing  Eyes: Negative for visual disturbance  No diplopia  Wears glasses  Respiratory: Negative for chest tightness and shortness of breath  Cardiovascular: Negative for chest pain and palpitations  Gastrointestinal: Positive for constipation and diarrhea  Negative for abdominal pain and nausea  Can have loose bowel movements alternating with constipation  Endocrine: Positive for cold intolerance  Negative for heat intolerance, polydipsia and polyuria  Nocturia once a night     Neurological: Negative for tremors and numbness  Psychiatric/Behavioral: Positive for sleep disturbance  Negative for confusion  Has some difficulty getting back to sleep after wakes to urinate, mind races  Historical Information   Past Medical History:   Diagnosis Date    Breast cancer [facility]) 1972    right; mastectomy    [facility] 1992    left, post lumpectomy and AND with XRT and tamoxifen    Breast cancer ([facility] ) 05/2018    left and now mastectomy    Chronic acquired lymphedema     right upper extremity    Coronary artery disease     Depression     History of endocarditis     History of pneumothorax 2012    right post parathyroid surgery    Hyperlipidemia     Hypertension     Hypomagnesemia     Myocardial infarction ([facility] )     Osteoarthritis     Ovarian cancer (HCC)     stage 3C, right;  9 5 cm adenocarcinoma with 5 5 cm serosal met involving rectosigmoid colon   Received Taxol/carbiplatin    Personal history of calcium pyrophosphate deposition disease (CPPD)     Recurrent laryngeal nerve palsy 2012    due to traction injury for right parathyroidectomy    Squamous cell skin cancer 03/2018    scalp    Thymoma 2009    resected     Past Surgical History:   Procedure Laterality Date    BREAST LUMPECTOMY Left 1992    with AND    CHOLECYSTECTOMY OPEN      post cholecystostomy tube with hemorrhage    HYSTERECTOMY  1970    with appendectomy    MAMMO STEREOTACTIC BREAST BIOPSY LEFT (ALL INC) Left 2018    MASS EXCISION  2009    thymoma resection    MASTECTOMY Right     MASTECTOMY Left 05/2018    OVARY SURGERY      cancer removal with omenectomy and bowel resection with creation of gil's pouch    PARATHYROIDECTOMY Right 2012    PARATHYROIDECTOMY Right 01/2013    right superior parathyroid adenoma    SKIN CANCER EXCISION  03/2018    wide excision    THYROIDECTOMY      age 22 for hyperthyroidism, partial     Social History   Social History     Substance and Sexual Activity   Alcohol Use Never    Frequency: Never     Social History     Substance and Sexual Activity   Drug Use Never     Social History     Tobacco Use   Smoking Status Never Smoker   Smokeless Tobacco Never Used     Family History:   Family History   Problem Relation Age of Onset    Hypertension Mother     Stomach cancer Father     Diabetes type II Brother     Diabetes type II Brother     Breast cancer Sister     Cancer Brother     Cancer Brother     Heart disease Brother     Heart attack Sister        Meds/Allergies   Current Outpatient Medications   Medication Sig Dispense Refill    amLODIPine (NORVASC) 5 mg tablet Take 5 mg by mouth daily      aspirin (ECOTRIN LOW STRENGTH) 81 mg EC tablet Take 81 mg by mouth daily      Calcium Carbonate-Vitamin D (OSCAL 500/200 D-3 PO) Take by mouth Take 2 tablets daily      colchicine (COLCRYS) 0 6 mg tablet Take 0 6 mg by mouth daily      levothyroxine 100 mcg tablet Take 1 tablet (100 mcg total) by mouth daily 90 tablet 3    lisinopril (ZESTRIL) 20 mg tablet Take 20 mg by mouth daily      magnesium chloride-calcium (SLOW-MAG) 71 5-119 mg Take 2 tablets by mouth daily      metoprolol succinate (TOPROL-XL) 25 mg 24 hr tablet Take 25 mg by mouth daily       Multiple Vitamin (MULTI-VITAMIN DAILY PO) Take by mouth daily       polyethylene glycol (MIRALAX) 17 g packet Take 17 g by mouth every other day      simvastatin (ZOCOR) 10 mg tablet Take 10 mg by mouth daily at bedtime        No current facility-administered medications for this visit  Allergies   Allergen Reactions    Bactrim [Sulfamethoxazole-Trimethoprim] GI Intolerance    Penicillins Hives       Objective   Vitals: Blood pressure 124/76, pulse 82, height 5' 3" (1 6 m), weight 61 1 kg (134 lb 9 6 oz)  Invasive Devices     None                 Physical Exam   Constitutional: She is oriented to person, place, and time  She appears well-developed and well-nourished  HENT:   Head: Normocephalic and atraumatic     Eyes: Pupils are equal, round, and reactive to light  Conjunctivae and EOM are normal      No lid lag, stare, proptosis, or periorbital edema  Neck: Normal range of motion  Neck supple    1-1 5 cm palpable neck nodule which may or may not be thyroid lateral to the trachea and superior to her surgical site  No lymphadenopathy  No bruits over the carotids  Cardiovascular: Normal rate, regular rhythm and normal heart sounds  No murmur heard  Pulmonary/Chest: Effort normal and breath sounds normal  She has no wheezes  Musculoskeletal: Normal range of motion  She exhibits no edema or deformity  No tremor of the outstretched hands  Lymphadenopathy:     She has no cervical adenopathy  Neurological: She is alert and oriented to person, place, and time  She has normal reflexes  Skin: Skin is warm and dry  No rash noted  Vitals reviewed  The history was obtained from the review of the chart and from the patient and   Lab Results:     I have no recent blood work  No results found for this or any previous visit (from the past 70448 hour(s))        Future Appointments   Date Time Provider Solitario Sanchez   2/12/2020 11:20 AM Papo Perez, [address]

## 2019-08-26 ENCOUNTER — TELEPHONE (OUTPATIENT)
Dept: ENDOCRINOLOGY | Facility: HOSPITAL | Age: 79
End: 2019-08-26

## 2019-08-26 DIAGNOSIS — E89.0 POSTSURGICAL HYPOTHYROIDISM: Primary | ICD-10-CM

## 2019-08-26 DIAGNOSIS — R22.1 MASS OF RIGHT SIDE OF NECK: ICD-10-CM

## 2019-08-26 NOTE — TELEPHONE ENCOUNTER
I spoke to patient regarding her thyroid and neck ultrasound  There is a 2 8 cm right thyroid bed lesion that is solid and around with internal blood flow  I have recommended fine-needle aspiration biopsy  Under ultrasound guidance of this lesion  I will have the radiologist take an extra sample for afirma testing to be sent if there is an FLUS  Pathology  Report obtained

## 2019-08-26 NOTE — TELEPHONE ENCOUNTER
Patient called looking for results of thyroid ultrasound done on 8/22/19  Patient states she would like to know what the next steps are  Please advise

## 2019-09-11 ENCOUNTER — TELEPHONE (OUTPATIENT)
Dept: ENDOCRINOLOGY | Facility: HOSPITAL | Age: 79
End: 2019-09-11

## 2019-09-11 NOTE — TELEPHONE ENCOUNTER
Dr Jenean Closs from [facility] pathology would like to discuss results from recent US thyroid biopsy [phone]

## 2019-09-12 NOTE — TELEPHONE ENCOUNTER
I called and discussed with Dr Elena Montes (pathologist from [facility]) who feels this sample could represent metastasis from breast cancer though she cannot entirely be sure given it is a fine-needle aspiration sample  I spoke to the patient's oncologist Dr Denise Flores ([facility]) who suggested referral to surgery for surgical removal/biopsy  I spoke to the patient related this information    She would prefer to talk to Dr Denise Flores 1st   I did offer her contact information for a surgeon and off her to reach out to the surgeon to arrange follow-up, but she would prefer to talk to her oncologist 1st

## 2019-11-13 ENCOUNTER — TELEPHONE (OUTPATIENT)
Dept: ENDOCRINOLOGY | Facility: HOSPITAL | Age: 79
End: 2019-11-13

## 2019-11-13 NOTE — TELEPHONE ENCOUNTER
I believe they may make an over the counter liquid calcium/vit D combo  She may need to look at the pharmacy or call the pharmacy and ask if they have it in stock

## 2019-11-13 NOTE — TELEPHONE ENCOUNTER
Received call from patient  She states she is following with Benjamin Germain for her thyroid cancer  She notes difficulty swallowing, he is only able to take her levothyroxine by crushing it and putting it in applesauce  She wants to know if there is another alternative for taking the levothyroxine and her caltrate?

## 2019-11-13 NOTE — TELEPHONE ENCOUNTER
One option would be to switch her to the same dose of the liquid solution Tirosint  It will likely be more expensive but given the situation we may be able to get a tier exception or something

## 2019-11-13 NOTE — TELEPHONE ENCOUNTER
Spoke with patient  She states she is having a feeding tube put in tomorrow  She wants to know if the levothyroxine can be crushed and put in water  Or can it be giving through the feeding tube?

## 2019-11-13 NOTE — TELEPHONE ENCOUNTER
Levothyroxine can be given by feeding tube  It just needs at least 30-60 minutes without any tube feed formula so it absorbs

## 2019-11-15 NOTE — TELEPHONE ENCOUNTER
Received call from patient's , he notes the patient was unable to have the procedure yesterday due to the size of her esophagus  The patient is currently at [facility]  They are considering going on Hospice  Patient's  states he will update the office as things progress

## 2020-01-27 NOTE — TELEPHONE ENCOUNTER
Blood work should be done closer to the appointment  I ordered blood work and yes the codes will cover all blood work 
Called patient to let her know & mailed her the new lab slip 
Previous BME Pt was due this month for an appt with you but it was moved to 2/21  She is asking if you want her to get the labs done now or closer to February? She needs a new slip for CMP, PHOSPHORUS, FREE T4, TSH  She gave codse E21 [id number] and E89 0  With her having a secondary insurance besides Medicare will those cover the CMP and Phosphorus? They are Thyroid codes 
no stemi

## 2020-02-03 ENCOUNTER — TELEPHONE (OUTPATIENT)
Dept: ENDOCRINOLOGY | Facility: HOSPITAL | Age: 80
End: 2020-02-03

## 2020-02-03 NOTE — TELEPHONE ENCOUNTER
Received a message from patient, she notes that since her last visit she was diagnosed with thyroid cancer and had a feeding tube put in since she has issues swallowing  She was also in [facility] [address]  [facility] [address] a few weeks ago with cellulitis in her arm  She had some labs done in the hospital and is questioning if they are what you wanted for her appointment  [facility] will be sending them over shortly

## 2020-02-06 NOTE — TELEPHONE ENCOUNTER
The only blood work I do not have is a thyroglobulin level which is used in following patients with thyroid cancer  We can discuss this at the office visit as to whether not we need to do that

## 2020-02-12 ENCOUNTER — OFFICE VISIT (OUTPATIENT)
Dept: ENDOCRINOLOGY | Facility: HOSPITAL | Age: 80
End: 2020-02-12
Payer: MEDICARE

## 2020-02-12 VITALS
HEART RATE: 84 BPM | WEIGHT: 126.2 LBS | HEIGHT: 63 IN | BODY MASS INDEX: 22.36 KG/M2 | DIASTOLIC BLOOD PRESSURE: 80 MMHG | SYSTOLIC BLOOD PRESSURE: 128 MMHG

## 2020-02-12 DIAGNOSIS — C73 THYROID CANCER (HCC): ICD-10-CM

## 2020-02-12 DIAGNOSIS — E89.0 POSTSURGICAL HYPOTHYROIDISM: Primary | ICD-10-CM

## 2020-02-12 DIAGNOSIS — E21.3 HYPERPARATHYROIDISM, UNSPECIFIED (HCC): ICD-10-CM

## 2020-02-12 DIAGNOSIS — Z98.890 HISTORY OF PARATHYROID SURGERY: ICD-10-CM

## 2020-02-12 PROCEDURE — 99215 OFFICE O/P EST HI 40 MIN: CPT | Performed by: INTERNAL MEDICINE

## 2020-02-12 RX ORDER — LEVOTHYROXINE SODIUM 0.12 MG/1
125 TABLET ORAL DAILY
COMMUNITY
End: 2020-02-12 | Stop reason: SDUPTHER

## 2020-02-12 RX ORDER — LEVOTHYROXINE SODIUM 0.12 MG/1
125 TABLET ORAL DAILY
Qty: 90 TABLET | Refills: 3 | Status: SHIPPED | OUTPATIENT
Start: 2020-02-12 | End: 2020-02-12 | Stop reason: SDUPTHER

## 2020-02-12 RX ORDER — LEVOTHYROXINE SODIUM 0.12 MG/1
125 TABLET ORAL DAILY
Qty: 30 TABLET | Refills: 6 | Status: SHIPPED | OUTPATIENT
Start: 2020-02-12

## 2020-02-12 RX ORDER — OMEPRAZOLE 40 MG/1
40 CAPSULE, DELAYED RELEASE ORAL DAILY
COMMUNITY

## 2020-02-12 NOTE — PROGRESS NOTES
2/12/2020    Assessment/Plan      Diagnoses and all orders for this visit:    Postsurgical hypothyroidism  -     TSH, 3rd generation Lab Collect; Future  -     T4, free Lab Collect; Future  -     Comprehensive metabolic panel Lab Collect; Future  -     TSH, 3rd generation Lab Collect; Future  -     T4, free Lab Collect; Future  -     Phosphorus Lab Collect; Future  -     Discontinue: levothyroxine 125 mcg tablet; Take 1 tablet (125 mcg total) by mouth daily  -     levothyroxine 125 mcg tablet; Take 1 tablet (125 mcg total) by mouth daily    Hyperparathyroidism, unspecified ([place] [address] )  -     Comprehensive metabolic panel Lab Collect; Future  -     TSH, 3rd generation Lab Collect; Future  -     T4, free Lab Collect; Future  -     Phosphorus Lab Collect; Future    Thyroid cancer ([place] [address] )  -     TSH, 3rd generation Lab Collect; Future  -     T4, free Lab Collect; Future  -     Comprehensive metabolic panel Lab Collect; Future  -     TSH, 3rd generation Lab Collect; Future  -     T4, free Lab Collect; Future  -     Phosphorus Lab Collect; Future  -     Discontinue: levothyroxine 125 mcg tablet; Take 1 tablet (125 mcg total) by mouth daily  -     levothyroxine 125 mcg tablet; Take 1 tablet (125 mcg total) by mouth daily    History of parathyroid surgery  -     Comprehensive metabolic panel Lab Collect; Future  -     TSH, 3rd generation Lab Collect; Future  -     T4, free Lab Collect; Future  -     Phosphorus Lab Collect; Future    Other orders  -     omeprazole (PriLOSEC) 40 MG capsule; Take 40 mg by mouth daily  -     Discontinue: levothyroxine 125 mcg tablet; Take 125 mcg by mouth daily        Assessment/Plan:  1  Thyroid cancer  Is unclear what type of thyroid cancer as she has only had a fine-needle aspiration biopsy  She has refused surgical removal   At this point, further thyroid cancer treatment is through her oncologist who may start a new agent which I presume will be a TKI    She now has a feeding tube since she cannot swallow with the thyroid cancer  2  Hypothyroidism post thyroidectomy for hyperthyroidism  Most recent thyroid function tests do show that she is still slightly hypothyroid despite an increased dose in levothyroxine last month  Unfortunately, it is not yet enough time to see the TSH normalize after a dosage adjustment and she just switched her levothyroxine dosage to 1 hour before tube feeds instead of taking it with her tube feeds  It usually takes about 8 weeks  For now, she will continue the same levothyroxine 125 mcg daily I will repeat her thyroid function tests in 8 weeks and adjust accordingly  3  Hyperparathyroidism post parathyroid surgery  Most recent calcium level and parathyroid hormone levels are normal   It does not appear that her parathyroid disease has recurred at this point  I will repeat a TSH with free T4 in 8 weeks  I have asked her to follow up in 6 months with preceding TSH, free T4, CMP, and phosphorus  CC:  Thyroid and parathyroid follow-up    History of Present Illness     HPI: Tita Hobson is a 79y o  year old female with history of hypothyroidism post thyroidectomy and hyperparathyroidism post 2 separate parathyroid adenoma removals here for follow-up  She had a right-sided neck mass with swallowing issues noted in August 2019  This neck mass was biopsied and reportedly was thyroid carcinoma  The pathology in September 2019 showed neoplasm with oncocytic features which did stain positive for thyroglobulin, chromogranin, and PgR  It was thought to be a follicular derived oncocytic lesion/neoplasm or a medullary thyroid carcinoma  Calcitonin levels were normal   She went to oncologist  Had a second biopsy and sent to [facility]  Then saw ENT surgeon  Then went to [facility]  She saw DR Matt  She decide to forgo surgery to remove the thyroid cancer  The esophagus is closing off  Feeding tube placed from abdomen   There was hemorrhage from feeding tube and then emergency surgery was needed  Feeding tube was placed  She was hospitalized for 3 weeks and then to rehab  Has been home trying to recover and then cellulitis of right arm and in hospital on IV antibiotics for 3 days and the outpatient oral meds  Seeing [facility] oncology  She is unable to eat and has a peg tube and getting bolus feedings 3 1/2 cans a day   helps with it  Has had complications with wound dehisence in various areas  She is to see oncology in march after CT scan  There is some mass in the esophagus which may not be thyroid cancer  She is currently levothyroxine 125 mcg daily increased at [facility] in jan 2020  She waits 1 hour from giving levothyroxine to get feeding  She switched this over 1 month ago  TSH was over 17 in the hospital in jan 2020  She is fatigued  Weight is 8 lb less than August 2019  She is always somewhat cold  She has some depression  She has chronic loose stools  She denies heat intolerance, palpitation, tremors, anxiety, insomnia, or constipation  She has dry skin and some hair loss but no brittle nails  She has no diplopia  She has the inability to swallow due to the right neck mass and a mass in her esophagus  She has a history of hyperparathyroidism with 2 surgeries to remove her parathyroid adenoma  She denies polyuria or polydipsia but has once or twice a night nocturia  She has no constipation but is fatigued  She has no mental confusion  Review of Systems   Constitutional: Positive for fatigue and unexpected weight change  Weight 8 lb less than August 2019  HENT: Positive for trouble swallowing  Can't swallow  Eyes: Negative for visual disturbance  Wears glasses  Respiratory: Negative for chest tightness and shortness of breath  Cardiovascular: Negative for chest pain and palpitations  Gastrointestinal: Positive for diarrhea  Negative for abdominal pain, constipation and nausea          Has PEG tube in place  Can not swallow  Bowels chronically loose  Endocrine: Positive for cold intolerance  Negative for heat intolerance, polydipsia and polyuria  Nocturia 1-2 times a night  Skin: Negative for rash  Has dry skin  No brittle nails  Some hair loss  Neurological: Negative for dizziness, tremors, light-headedness and headaches  Psychiatric/Behavioral: Positive for dysphoric mood  Negative for confusion and sleep disturbance  The patient is not nervous/anxious  Some depression  Historical Information   Past Medical History:   Diagnosis Date    Breast cancer [facility]) 1972    right; mastectomy    [facility] 1992    left, post lumpectomy and AND with XRT and tamoxifen    Breast cancer ([place] [address] ) 05/2018    left and now mastectomy    Chronic acquired lymphedema     right upper extremity    Coronary artery disease     Depression     History of endocarditis     History of pneumothorax 2012    right post parathyroid surgery    Hyperlipidemia     Hypertension     Hypomagnesemia     Myocardial infarction ([place] [address] )     Osteoarthritis     Ovarian cancer (HCC)     stage 3C, right;  9 5 cm adenocarcinoma with 5 5 cm serosal met involving rectosigmoid colon   Received Taxol/carbiplatin    Personal history of calcium pyrophosphate deposition disease (CPPD)     Recurrent laryngeal nerve palsy 2012    due to traction injury for right parathyroidectomy    Squamous cell skin cancer 03/2018    scalp    Thymoma 2009    resected     Past Surgical History:   Procedure Laterality Date    BREAST LUMPECTOMY Left 1992    with AND    CHOLECYSTECTOMY OPEN      post cholecystostomy tube with hemorrhage    EXPLORATORY LAPAROTOMY  11/2019    with PEG tube placed   [address]    with appendectomy    MAMMO STEREOTACTIC BREAST BIOPSY LEFT ([facility]) Left 2018    MASS EXCISION  2009    thymoma resection    MASTECTOMY Right     MASTECTOMY Left 05/2018    OVARY SURGERY      cancer removal with omenectomy and bowel resection with creation of gil's pouch    PARATHYROIDECTOMY Right 2012    PARATHYROIDECTOMY Right 01/2013    right superior parathyroid adenoma    SKIN CANCER EXCISION  03/2018    wide excision    THYROIDECTOMY      age 22 for hyperthyroidism, partial     Social History   Social History     Substance and Sexual Activity   Alcohol Use Never    Frequency: Never     Social History     Substance and Sexual Activity   Drug Use Never     Social History     Tobacco Use   Smoking Status Never Smoker   Smokeless Tobacco Never Used     Family History:   Family History   Problem Relation Age of Onset    Hypertension Mother     Stomach cancer Father     Diabetes type II Brother     Diabetes type II Brother     Breast cancer Sister     Cancer Brother     Cancer Brother     Heart disease Brother     Heart attack Sister        Meds/Allergies   Current Outpatient Medications   Medication Sig Dispense Refill    aspirin (ECOTRIN LOW STRENGTH) 81 mg EC tablet Take 81 mg by mouth daily      colchicine (COLCRYS) 0 6 mg tablet Take 0 6 mg by mouth daily      levothyroxine 125 mcg tablet Take 1 tablet (125 mcg total) by mouth daily 30 tablet 6    metoprolol succinate (TOPROL-XL) 25 mg 24 hr tablet Take 25 mg by mouth 25 mg in am and 12 5 mg 2 other times during the day      omeprazole (PriLOSEC) 40 MG capsule Take 40 mg by mouth daily      simvastatin (ZOCOR) 10 mg tablet Take 10 mg by mouth daily at bedtime        No current facility-administered medications for this visit  Allergies   Allergen Reactions    Bactrim [Sulfamethoxazole-Trimethoprim] GI Intolerance    Penicillins Hives       Objective   Vitals: Blood pressure 128/80, pulse 84, height 5' 3" (1 6 m), weight 57 2 kg (126 lb 3 2 oz)  Invasive Devices     None                 Physical Exam   Constitutional: She is oriented to person, place, and time  She appears well-developed and well-nourished     HENT:   Head: Normocephalic and atraumatic  Negative Chvostek sign  Eyes: Pupils are equal, round, and reactive to light  Conjunctivae and EOM are normal    No lid lag, stare, proptosis, or periorbital edema  Neck: Normal range of motion  Neck supple  Healed anterior neck scar  No palpable thyroid tissue on the left  4-5 cm right neck mass palpable with 1 cm isthmus area nodule  No bruits over the thyroid gland or carotids  Cardiovascular: Normal rate, regular rhythm and normal heart sounds  No murmur heard  Pulmonary/Chest: Effort normal and breath sounds normal  She has no wheezes  Musculoskeletal: She exhibits no edema or deformity  No tremor of the outstretched hands  No spinous process tenderness  No CVA tenderness  Lymphadenopathy:     She has no cervical adenopathy  Neurological: She is alert and oriented to person, place, and time  She has normal reflexes  Deep tendon reflexes normal    Skin: Skin is warm and dry  No rash noted  Vitals reviewed  The history was obtained from the review of the chart and from the patient and   Lab Results:   Blood work done a [facility] on 02/06/2020 demonstrated a TSH of 10 55 with a free T4 1 33  This was only several weeks after her levothyroxine dose was increased  She had also just switched from taking her levothyroxine with her tube feeds to an hour before her tube feeds  CMP showed a glucose of 113 random, calcium 9 2 with an albumin of 3 8, BUN/creatinine ratio 38 9, CO2 30, chloride 97, BUN 28, but was otherwise normal   Intact parathyroid hormone level was 38       Future Appointments   Date Time Provider Solitario Sanchez   8/11/2020  1:20 PM Yusuf Corona MD [facility]

## 2020-02-12 NOTE — PATIENT INSTRUCTIONS
Most recent blood work still shows an underactive thyroid but improving with the new dose of levothyroxine  Continue the same levothyroxine 125 mcg daily  It may take 8 weeks to normalize out  We'll repeat the thyroid blood work in 8 weeks  We'll adjust over the phone if needed  Follow up in 6 months with blood work

## 2020-07-23 ENCOUNTER — TELEPHONE (OUTPATIENT)
Dept: ENDOCRINOLOGY | Facility: HOSPITAL | Age: 80
End: 2020-07-23

## 2020-07-23 NOTE — TELEPHONE ENCOUNTER
Patient called to cx her upcoming appt and to inform you she is see Dr Dami Gonzalez at [facility] for chemo

## 2021-02-12 DIAGNOSIS — Z23 ENCOUNTER FOR IMMUNIZATION: ICD-10-CM
